# Patient Record
Sex: FEMALE | Employment: STUDENT
[De-identification: names, ages, dates, MRNs, and addresses within clinical notes are randomized per-mention and may not be internally consistent; named-entity substitution may affect disease eponyms.]

---
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
## 2024-08-15 ENCOUNTER — TELEPHONE (OUTPATIENT)
Dept: INTERNAL MEDICINE CLINIC | Facility: CLINIC | Age: 19
End: 2024-08-15

## 2024-08-15 NOTE — TELEPHONE ENCOUNTER
Patient  walked in office requesting medical records to be faxed over to facility.     Filled out FACUNDO, sent forms via email, and inter office envelope.

## 2025-04-06 NOTE — PROGRESS NOTES
HPI:   Tona Guerra is a 19 year old female who presents for a complete physical exam. Symptoms: periods are regular, has significant cramping.    Regular SBE- no,Sexually active- yes,  Contraception- no. STD history- no    Patient complains of bilateral knee aching and swelling x 1 year. Patient had been in sports all her life- baseball, basketball,track. Pt never had an injury-probable overuse. Red and swelling after exercise. Worse over the past few months. Reports father and father's side has a history of fluid in knees. Typically no pain upon waking in morning but pain will develop over day. Denies fevers, body aches, chills. Taking ibuprofen occasionally.    Also c/o mid back pain with some neck stiffness x 1 year. Reports having lifted something heavy and felt pain and has been having pain since. Worse in the morning. Pain does not radiate. Denies loss of bowel or bladder control. Would like to see chiropractor.     Screening:  Immunization History   Administered Date(s) Administered    Covid-19 Vaccine Pfizer 30 mcg/0.3 ml 04/22/2021, 05/13/2021    DTAP 03/04/2005, 04/28/2005, 07/01/2005, 07/11/2006, 01/05/2009    HEP A,Ped/Adol,(2 Dose) 06/05/2019, 10/19/2020    HEP B 03/04/2005, 04/28/2005, 01/02/2006    HIB 03/04/2005, 04/28/2005, 01/02/2006    HPV (Gardasil) 05/16/2016, 06/20/2016    Hpv Virus Vaccine 9 Ela Im 10/19/2020    IPV 03/04/2005, 04/28/2005, 07/01/2005, 01/05/2009    MMR 01/02/2006, 01/05/2009    Meningococcal-Menveo 2month-55yr 05/16/2016, 07/13/2022    Pneumococcal (Prevnar 7) 03/04/2005, 04/28/2005, 07/01/2005, 01/02/2006    TDAP 06/20/2016    Varicella 01/02/2006, 01/05/2009   Deferred Date(s) Deferred    FLULAVAL 6 months & older 0.5 ml Prefilled syringe (21076) 10/19/2020      Menarche- 12y/o  PAP- n/a  Any abnormal pap smears- n/a  Pregnancies- 0, Live births- 0  Mammogram-  n/a   Any breast cancer- no, or any gynecological cancer- no, any cancers- maternal grandmother and paternal  grandfather cancer  Labs- n/a  DEXA over 65yr- n/a  Flu shot-  done  Colon- n/a  Glasses/contacts- no, last exam- 11/2024  Dental visits- 11/2024    Immunization History   Administered Date(s) Administered    Covid-19 Vaccine Pfizer 30 mcg/0.3 ml 04/22/2021, 05/13/2021    DTAP 03/04/2005, 04/28/2005, 07/01/2005, 07/11/2006, 01/05/2009    HEP A,Ped/Adol,(2 Dose) 06/05/2019, 10/19/2020    HEP B 03/04/2005, 04/28/2005, 01/02/2006    HIB 03/04/2005, 04/28/2005, 01/02/2006    HPV (Gardasil) 05/16/2016, 06/20/2016    Hpv Virus Vaccine 9 Ela Im 10/19/2020    IPV 03/04/2005, 04/28/2005, 07/01/2005, 01/05/2009    MMR 01/02/2006, 01/05/2009    Meningococcal-Menveo 2month-55yr 05/16/2016, 07/13/2022    Pneumococcal (Prevnar 7) 03/04/2005, 04/28/2005, 07/01/2005, 01/02/2006    TDAP 06/20/2016    Varicella 01/02/2006, 01/05/2009   Deferred Date(s) Deferred    FLULAVAL 6 months & older 0.5 ml Prefilled syringe (21171) 10/19/2020     Wt Readings from Last 6 Encounters:   04/07/25 163 lb (73.9 kg)   03/29/23 160 lb (72.6 kg) (90%, Z= 1.25)*   07/13/22 145 lb (65.8 kg) (81%, Z= 0.89)*   11/08/21 153 lb (69.4 kg) (88%, Z= 1.17)*   09/28/21 145 lb (65.8 kg) (83%, Z= 0.95)*   08/21/21 146 lb 6.2 oz (66.4 kg) (84%, Z= 1.00)*     * Growth percentiles are based on CDC (Girls, 2-20 Years) data.     Body mass index is 28.52 kg/m².     No results found for: \"GLU\", \"GLUCOSE\"  No results found for: \"CHOLEST\"  No results found for: \"HDL\"  No results found for: \"LDL\"  No results found for: \"AST\"  No results found for: \"ALT\"    Current Outpatient Medications   Medication Sig Dispense Refill    Meloxicam 15 MG Oral Tab Take 1 tablet (15 mg total) by mouth daily. 30 tablet 0      No past medical history on file.   No past surgical history on file.   Family History   Problem Relation Age of Onset    Cancer Maternal Grandmother     Heart Disease Maternal Grandfather     Stroke Neg       Social History:   Social History     Socioeconomic History     Marital status: Single   Tobacco Use    Smoking status: Never    Smokeless tobacco: Never   Vaping Use    Vaping status: Never Used   Substance and Sexual Activity    Alcohol use: No    Drug use: No   Other Topics Concern    Caffeine Concern Yes     Comment: sometimes soda    Exercise Yes     Comment: Daily     Occ: [employer]. : no. Children: 0.   Exercise:  5 times per week.  Diet: watches minimally     REVIEW OF SYSTEMS:   GENERAL: feels well otherwise  SKIN: denies any unusual skin lesions  EYES:denies blurred vision or double vision  HEENT: denies nasal congestion, sinus pain or ST  LUNGS: denies shortness of breath with exertion  CARDIOVASCULAR: denies chest pain on exertion  GI: denies abdominal pain,denies heartburn  : denies dysuria, vaginal discharge or itching,periods regular   MUSCULOSKELETAL: see HPI  NEURO: denies headaches  PSYCHE: denies depression or anxiety  HEMATOLOGIC: denies hx of anemia  ENDOCRINE: denies thyroid history  ALL/ASTHMA: denies hx of allergy or asthma    EXAM:   /72 (BP Location: Left arm, Patient Position: Sitting, Cuff Size: adult)   Pulse 74   Temp 98.2 °F (36.8 °C) (Temporal)   Resp 14   Ht 5' 3.39\" (1.61 m)   Wt 163 lb (73.9 kg)   LMP 03/27/2025   SpO2 98%   BMI 28.52 kg/m²   Body mass index is 28.52 kg/m².   GENERAL: well developed, well nourished,in no apparent distress  SKIN: no rashes,no suspicious lesions  HEENT: atraumatic, normocephalic,ears and throat are clear  EYES: PERRLA, EOMI, normal optic disk, conjunctiva are clear  NECK: supple, no adenopathy, no bruits  CHEST: no chest tenderness  BREAST: deferred  LUNGS: clear to auscultation  CARDIO: RRR without murmur  GI: good BS's,no masses, HSM or tenderness  : deferred  RECTAL: deferred  MUSCULOSKELETAL: back is not tender currently. FROM of the back and neck  EXTREMITIES: no cyanosis, clubbing or edema.  KNEES: No erythema to BL knees, FROM. Minimal swelling to inner aspect of knee.  NEURO:  Oriented times three,cranial nerves are intact, motor and sensory are grossly intact    ASSESSMENT AND PLAN:     Encounter Diagnoses   Name Primary?    Encounter for routine history and physical examination Yes    Chronic pain of both knees     Chronic midline thoracic back pain     Neck pain        No orders of the defined types were placed in this encounter.      Meds & Refills for this Visit:  Requested Prescriptions     Signed Prescriptions Disp Refills    Meloxicam 15 MG Oral Tab 30 tablet 0     Sig: Take 1 tablet (15 mg total) by mouth daily.       Imaging & Consults:  XR KNEE (3 VIEWS), RIGHT (CPT=73562)  XR KNEE (3 VIEWS), LEFT (CPT=73562)    1. Encounter for routine history and physical examination  - patient reports had labs done through the [facility] last month  - discussed balanced diet and regular exercise  - annual eye exam, biannual dental exam    2. Chronic pain of both knees  - will image, depending on results, may need ortho referral  - try meloxicam once daily, do not use ibuprofen if using rx  - XR KNEE (3 VIEWS), RIGHT (CPT=73562); Future  - XR KNEE (3 VIEWS), LEFT (CPT=73562); Future  - Meloxicam 15 MG Oral Tab; Take 1 tablet (15 mg total) by mouth daily.  Dispense: 30 tablet; Refill: 0    3. Chronic midline thoracic back pain  - patient to see chiropractor, rx as blow  - Meloxicam 15 MG Oral Tab; Take 1 tablet (15 mg total) by mouth daily.  Dispense: 30 tablet; Refill: 0    4. Neck pain  - see above  - Meloxicam 15 MG Oral Tab; Take 1 tablet (15 mg total) by mouth daily.  Dispense: 30 tablet; Refill: 0    Tona Sheets is a 19 year old female who presents for a complete physical exam. Self breast exam explained.  Pt' s weight is Body mass index is 28.52 kg/m²., recommended low fat diet and aerobic exercise 30 minutes three times weekly. The patient indicates understanding of these issues and agrees to the plan.  The patient is asked to return for CPX in 1 year.

## 2025-04-07 ENCOUNTER — HOSPITAL ENCOUNTER (OUTPATIENT)
Dept: GENERAL RADIOLOGY | Age: 20
Discharge: HOME | End: 2025-04-07
Attending: FAMILY MEDICINE
Payer: OTHER GOVERNMENT

## 2025-04-07 ENCOUNTER — OFFICE VISIT (OUTPATIENT)
Dept: INTERNAL MEDICINE CLINIC | Facility: CLINIC | Age: 20
End: 2025-04-07
Payer: OTHER GOVERNMENT

## 2025-04-07 ENCOUNTER — PATIENT MESSAGE (OUTPATIENT)
Dept: INTERNAL MEDICINE CLINIC | Facility: CLINIC | Age: 20
End: 2025-04-07

## 2025-04-07 VITALS
HEART RATE: 74 BPM | SYSTOLIC BLOOD PRESSURE: 110 MMHG | OXYGEN SATURATION: 98 % | TEMPERATURE: 98 F | BODY MASS INDEX: 28.53 KG/M2 | HEIGHT: 63.39 IN | DIASTOLIC BLOOD PRESSURE: 72 MMHG | RESPIRATION RATE: 14 BRPM | WEIGHT: 163 LBS

## 2025-04-07 DIAGNOSIS — G89.29 CHRONIC MIDLINE THORACIC BACK PAIN: ICD-10-CM

## 2025-04-07 DIAGNOSIS — M25.562 CHRONIC PAIN OF BOTH KNEES: ICD-10-CM

## 2025-04-07 DIAGNOSIS — G89.29 CHRONIC PAIN OF BOTH KNEES: ICD-10-CM

## 2025-04-07 DIAGNOSIS — M54.6 CHRONIC MIDLINE THORACIC BACK PAIN: ICD-10-CM

## 2025-04-07 DIAGNOSIS — M25.561 CHRONIC PAIN OF BOTH KNEES: ICD-10-CM

## 2025-04-07 DIAGNOSIS — M54.2 NECK PAIN: ICD-10-CM

## 2025-04-07 DIAGNOSIS — Z00.00 ENCOUNTER FOR ROUTINE HISTORY AND PHYSICAL EXAMINATION: Primary | ICD-10-CM

## 2025-04-07 PROCEDURE — 73562 X-RAY EXAM OF KNEE 3: CPT | Performed by: FAMILY MEDICINE

## 2025-04-07 RX ORDER — MELOXICAM 15 MG/1
15 TABLET ORAL DAILY
Qty: 30 TABLET | Refills: 0 | Status: SHIPPED | OUTPATIENT
Start: 2025-04-07

## 2025-04-09 ENCOUNTER — TELEPHONE (OUTPATIENT)
Dept: INTERNAL MEDICINE CLINIC | Facility: CLINIC | Age: 20
End: 2025-04-09

## 2025-04-09 NOTE — TELEPHONE ENCOUNTER
Does Pt have to do strenuous exercise or training right now? If so, what does she want to limit? I need to know what to restrict her from. What does she feel she can not do right now.  Stella Anthony, GERALDINE, 04/09/25, 4:52 PM

## 2025-04-09 NOTE — TELEPHONE ENCOUNTER
Pt is requesting a doc note from  and sent a mcm on 4/7. Pt is wanting a doc note for dates 4/10-4/14 that states her restrictions/limitations for strenuous activity.    This can be sent to pt on mcm

## 2025-04-10 NOTE — TELEPHONE ENCOUNTER
How Long of a distance can she not walk. Can walk 1/2 a mile but noting over that or what?  Also what weight does she not want to carry over? Like nothing over 10#?, 15#?

## 2025-05-10 NOTE — ED INITIAL ASSESSMENT (HPI)
Monday, c/o cough and now has a sore throat and sinus congestion. Denies fevers. Tested negative for covid today. Normocytic anemia Complex renal cyst

## (undated) NOTE — LETTER
Date: 10/28/2019    Patient Name: Regulo Nation          To Whom it may concern: This letter has been written at the patient's request. The above patient was seen at the [facility] for treatment of a medical condition.     This patient talha

## (undated) NOTE — LETTER
Date: 11/10/2021    Patient Name: Desiree John          To Whom it may concern: This letter has been written at the patient's request. The above patient was seen at the [facility] for treatment of a medical condition- Tachycardia.     Reese Patel

## (undated) NOTE — LETTER
Date: 4/10/2025    Patient Name: Tona Guerra          To Whom it may concern:    The above patient was seen at formerly [facility] for treatment of a medical condition- Bilateral knee pain and mid thoracic pain.    The patient is to have the following limitations - unable to walk more than 1/2 a mile and no lifting over 10 pounds. Patient is starting some antiinflammatories and if not improving in the next few weeks, Patient will be referred to physical therapy and/or Orthopedics.    If you have any questions please contact the office.      Sincerely,          GERALDINE Varela

## (undated) NOTE — LETTER
[address] [address] [address]  [address] [facility] [address]  [phone]     Patient: Agnes Hoffman   YOB: 2005   Date of Visit: 9/28/2021     Dear Dane Conway,      September 28, 2021    [facility], we to develop illness if infected. Thus, it is possible that a person known to be infected could leave isolation earlier than a person who is quarantined because of the possibility they are infected.     Please visit the [facility] website for further information and

## (undated) NOTE — LETTER
Date: 10/22/2019    Patient Name: Chencho Mccoy          To Whom it may concern: The above patient was seen at the [facility] for treatment of a medical condition.     This patient should be excused from attending work/school from 10/22 t

## (undated) NOTE — ED AVS SNAPSHOT
Parent/Legal Guardian Access to the Online Generate Record of a Patient 15to 16Years Old  Return completed form by Secure email to [place] HIM/Medical Records Department: [email or web address]     Requirements and Procedures   Under [facility] MyChart ID and password with another person, that person may be able to view my or my child’s health information, and health information about someone who has authorized me as a MyChart proxy.    ·  I agree that it is my responsibility to select a confident Sign-Up Form and I agree to its terms.        Authorization Form     Please enter Patient’s information below:   Name (last, first, middle initial) __________________________________________   Gender  Male  Female    Last 4 Digits of Social Security Number Parent/Legal Guardian Signature                                  For Patient (1417 years of age)  I agree to allow my parent/legal guardian, named above, online access to my medical information currently available and that may become available as a result

## (undated) NOTE — LETTER
Date: 12/12/2019    Patient Name: Jeanine Aj          To Whom it may concern: The above patient was seen at the [facility] for treatment of a medical condition- Mono.       The patient may return to sports/gym on 12/12/19 with the foll

## (undated) NOTE — LETTER
Date: 4/3/2019    Patient Name: Agnes Hoffman          To Whom it may concern: This letter has been written at the patient's request. The above patient was seen at the [facility] for treatment of a medical condition.     This patient shoul